# Patient Record
Sex: FEMALE | URBAN - METROPOLITAN AREA
[De-identification: names, ages, dates, MRNs, and addresses within clinical notes are randomized per-mention and may not be internally consistent; named-entity substitution may affect disease eponyms.]

---

## 2023-04-06 ENCOUNTER — APPOINTMENT (OUTPATIENT)
Dept: URBAN - METROPOLITAN AREA CLINIC 273 | Age: 73
Setting detail: DERMATOLOGY
End: 2023-04-06

## 2023-04-06 DIAGNOSIS — L20.89 OTHER ATOPIC DERMATITIS: ICD-10-CM

## 2023-04-06 DIAGNOSIS — L24 IRRITANT CONTACT DERMATITIS: ICD-10-CM

## 2023-04-06 PROBLEM — L24.9 IRRITANT CONTACT DERMATITIS, UNSPECIFIED CAUSE: Status: ACTIVE | Noted: 2023-04-06

## 2023-04-06 PROCEDURE — 99203 OFFICE O/P NEW LOW 30 MIN: CPT | Mod: 25

## 2023-04-06 PROCEDURE — OTHER PRESCRIPTION MEDICATION MANAGEMENT: OTHER

## 2023-04-06 PROCEDURE — OTHER ADDITIONAL NOTES: OTHER

## 2023-04-06 PROCEDURE — OTHER COUNSELING: OTHER

## 2023-04-06 PROCEDURE — OTHER PRESCRIPTION: OTHER

## 2023-04-06 PROCEDURE — OTHER MIPS QUALITY: OTHER

## 2023-04-06 PROCEDURE — 96372 THER/PROPH/DIAG INJ SC/IM: CPT

## 2023-04-06 PROCEDURE — OTHER PRESCRIPTION SAMPLES PROVIDED: OTHER

## 2023-04-06 PROCEDURE — OTHER INTRAMUSCULAR KENALOG: OTHER

## 2023-04-06 RX ORDER — TACROLIMUS 1 MG/G
OINTMENT TOPICAL
Qty: 60 | Refills: 0 | Status: ERX | COMMUNITY
Start: 2023-04-06

## 2023-04-06 RX ORDER — TRIAMCINOLONE ACETONIDE 1 MG/G
OINTMENT TOPICAL
Qty: 80 | Refills: 1 | Status: ERX | COMMUNITY
Start: 2023-04-06

## 2023-04-06 ASSESSMENT — LOCATION SIMPLE DESCRIPTION DERM
LOCATION SIMPLE: CHEST
LOCATION SIMPLE: RIGHT INFERIOR EYELID
LOCATION SIMPLE: RIGHT ANTERIOR NECK
LOCATION SIMPLE: ABDOMEN
LOCATION SIMPLE: LEFT ANTERIOR NECK
LOCATION SIMPLE: RIGHT SUPERIOR EYELID
LOCATION SIMPLE: RIGHT BUTTOCK
LOCATION SIMPLE: LEFT SUPERIOR EYELID

## 2023-04-06 ASSESSMENT — LOCATION DETAILED DESCRIPTION DERM
LOCATION DETAILED: UPPER STERNUM
LOCATION DETAILED: LEFT LATERAL SUPERIOR EYELID
LOCATION DETAILED: RIGHT BUTTOCK
LOCATION DETAILED: LEFT CLAVICULAR NECK
LOCATION DETAILED: RIGHT MEDIAL SUPERIOR EYELID
LOCATION DETAILED: RIGHT CLAVICULAR NECK
LOCATION DETAILED: SUBXIPHOID
LOCATION DETAILED: RIGHT LATERAL INFERIOR EYELID

## 2023-04-06 ASSESSMENT — LOCATION ZONE DERM
LOCATION ZONE: EYELID
LOCATION ZONE: NECK
LOCATION ZONE: TRUNK

## 2023-04-06 ASSESSMENT — BSA RASH: BSA RASH: 2

## 2023-04-06 ASSESSMENT — ITCH NUMERIC RATING SCALE: HOW SEVERE IS YOUR ITCHING?: 10

## 2023-04-06 NOTE — PROCEDURE: ADDITIONAL NOTES
Render Risk Assessment In Note?: no
Detail Level: Simple
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 No known triggers at this time but switch to fragrance free moisturizes, soaps, detergents etc. pt not interested in further allergy testing at this time.\\nMay use Zyrtec 10mg up to 4 daily if needed. Hydroxyzine qhs prn and Benadryl if needed.\\nEucerin or cerave anti itch lotions prn.\\nWill attempt coverage of tacrolimus ointment to use Bid to eyelids, however samples of eucrisa given for eyelids to use bid for the next few days until rx arrives.\\nMay use tac car thinly bid prn to active trunk.\\nIf continues to recur, may consider Dupixent in future. Discussed immune component to AD.

## 2023-04-06 NOTE — HPI: RASH
What Type Of Note Output Would You Prefer (Optional)?: Standard Output
Is The Patient Presenting As Previously Scheduled?: Yes
How Severe Is Your Rash?: moderate
Is This A New Presentation, Or A Follow-Up?: Rash
Additional History: She complains of very itchy rash for the past few days to inframammary, circumferential to neck, and now to eyelids. She states she has had 2 other outbreaks like this in the past year. She reports history of chronic irritated skin and rash intermittently since age 5. Also reports significant hx of seasonal and environmental allergies. Has previously seen Dr Tucker dermatology and received intermittent oral, IM, and topical steroids prn. Has been patch tested and allergy tested in the past as well, and she felt there were no pertinent positives. She has hydroxyzine but states it makes her quite drowsy. She has clobetasol but has not used this.\\n\\nDenies rash or itching elsewhere at this time but she has had it to extremities in the past. Uses tide unscented detergent and scented moisturizer. She has not changed any products lately. No recent illness. Has taken a new herbal supplement in the past few days which she has now stopped.

## 2023-04-06 NOTE — PROCEDURE: COUNSELING
Topical Steroids Recommendations: Apply as instructed thinly and consistently BID x2 weeks before taking a break. May use periodically as needed for recurrences, but do not use daily - instructed about long term adverse reactions of TCS.
Bleach Bath Recommendations: May use diluted bleach bath - 1/2 cup of 6% bleach in a full bathtub of lukewarm water. This can be performed between episodes of clinical infection to reduce the cutaneous load of Staph aureus and improve symptoms.
Detail Level: Detailed
Cleanser Recommendations: Gentle, fragrance-free cleansers such as Cerave, Cetaphil, Eucerin, or La Roche Posay.
Moisturizer Recommendations: Gentle, fragrance-free moisturizers such as Cerave, Cetaphil, Eucerin, or La Roche Posay. Apply at least BID, preferably when skin is damp.
Antihistamine Recommendations: Zyrtec may be taken for associated pruritus - 10mg QD, or up to 2 PO BID if tolerated.

## 2023-04-06 NOTE — PROCEDURE: PRESCRIPTION MEDICATION MANAGEMENT
Detail Level: Zone
Render In Strict Bullet Format?: No
Initiate Treatment: Tacrolimus around eyes, Triamcinolone

## 2023-04-06 NOTE — PROCEDURE: MIPS QUALITY
Quality 226: Preventive Care And Screening: Tobacco Use: Screening And Cessation Intervention: Patient screened for tobacco use and is an ex/non-smoker
Quality 431: Preventive Care And Screening: Unhealthy Alcohol Use - Screening: Patient not identified as an unhealthy alcohol user when screened for unhealthy alcohol use using a systematic screening method
Detail Level: Detailed
Quality 402: Tobacco Use And Help With Quitting Among Adolescents: Patient screened for tobacco and never smoked
Quality 110: Preventive Care And Screening: Influenza Immunization: Influenza Immunization previously received during influenza season